# Patient Record
Sex: MALE | Race: WHITE | NOT HISPANIC OR LATINO | URBAN - METROPOLITAN AREA
[De-identification: names, ages, dates, MRNs, and addresses within clinical notes are randomized per-mention and may not be internally consistent; named-entity substitution may affect disease eponyms.]

---

## 2017-02-19 ENCOUNTER — COMMUNICATION - HEALTHEAST (OUTPATIENT)
Dept: SCHEDULING | Facility: CLINIC | Age: 22
End: 2017-02-19

## 2017-07-27 ENCOUNTER — COMMUNICATION - HEALTHEAST (OUTPATIENT)
Dept: SCHEDULING | Facility: CLINIC | Age: 22
End: 2017-07-27

## 2017-07-28 ENCOUNTER — OFFICE VISIT - HEALTHEAST (OUTPATIENT)
Dept: FAMILY MEDICINE | Facility: CLINIC | Age: 22
End: 2017-07-28

## 2017-07-28 DIAGNOSIS — B35.4 TINEA CORPORIS: ICD-10-CM

## 2017-07-28 RX ORDER — HYDROCORTISONE 2.5 %
CREAM (GRAM) TOPICAL 2 TIMES DAILY
Status: SHIPPED | COMMUNITY
Start: 2017-07-28

## 2021-05-31 VITALS — BODY MASS INDEX: 26.7 KG/M2 | WEIGHT: 196.9 LBS

## 2021-06-12 NOTE — PROGRESS NOTES
"ASSESSMENT/PLAN:   1. Tinea corporis  clotrimazole (LOTRIMIN) 1 % cream       Rash character is consistent with tinea corporis on his face. We will treat with clotrimazole cream. Discussed contagiousness.    At the end of the encounter, I discussed results, diagnosis, medications. Discussed red flags for immediate return to clinic/ER, as well as indications for follow up if no improvement. Patient understood and agreed to plan. Patient was stable for discharge.      Patient Instructions:  Patient Instructions   Your rash is most consistent with ringworm.    1. Apply clotrimazole cream twice daily x 2 weeks.    2. Avoid touching face. Avoid direct skin-to-skin contact in those areas. Wash hands frequently. Wash all your towels and bedding.    3. Observe for improvement. If no improvement or worsening, follow up with primary care provider.                    SUBJECTIVE:   Iban Bateman is a 22 y.o. male who presents today for evaluation of a facial rash x 2.5 weeks. It started with a \"tiny\" red spot on the left cheek. Then, it progressively got bigger. Then, he developed some more similar red circles lower down on his face. It does not itch, burn, or hurt. No drainage. No rashes anywhere else on the body. No fever. No known exposures to new soaps or facial products. 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 His friend had a similar rash several months ago. He has been trying an OTC hydrocortisone and it has actually gotten worse.      Past Medical History:  Otherwise healthy; no chronic medical conditions    Surgical History:  Non-contributory    Family History:  Non-contributory      Social History:    History   Smoking Status     Current Every Day Smoker   Smokeless Tobacco     Not on file     Smoking: none  Alcohol use: 10 drinks/week on weekends  Other drug use: marijuana  Occupation: works at [employer]      Current Medications:  No current outpatient " prescriptions on file prior to visit.     No current facility-administered medications on file prior to visit.        Allergies:   No Known Allergies    I personally reviewed patient's past medical, surgical, social, family history and allergies.    ROS:  Review of Systems  See HPI, otherwise negative      OBJECTIVE:   Vitals:    07/28/17 1449   BP: 120/80   Pulse: 70   Temp: 98.1  F (36.7  C)   TempSrc: Oral   SpO2: 98%   Weight: 196 lb 14.4 oz (89.3 kg)       General Appearance:  Alert, well-appearing young male in NAD. Afebrile.    Integument: there are 5 erythematous annular lesions with central clearing on patient's left cheek. No crusting or drainage. There are few tiny papular lesions in the beard line.  HEENT: Oropharynx: no oral rashes or lesions.  Neck: Supple  Respiratory: No distress.